# Patient Record
Sex: FEMALE | Race: BLACK OR AFRICAN AMERICAN | Employment: FULL TIME | URBAN - METROPOLITAN AREA
[De-identification: names, ages, dates, MRNs, and addresses within clinical notes are randomized per-mention and may not be internally consistent; named-entity substitution may affect disease eponyms.]

---

## 2021-04-05 ENCOUNTER — OFFICE VISIT (OUTPATIENT)
Dept: ENT CLINIC | Age: 36
End: 2021-04-05
Payer: COMMERCIAL

## 2021-04-05 VITALS
WEIGHT: 240 LBS | SYSTOLIC BLOOD PRESSURE: 111 MMHG | TEMPERATURE: 97.4 F | BODY MASS INDEX: 40.97 KG/M2 | RESPIRATION RATE: 14 BRPM | HEART RATE: 72 BPM | DIASTOLIC BLOOD PRESSURE: 73 MMHG | HEIGHT: 64 IN

## 2021-04-05 DIAGNOSIS — H60.40 SQUAMOUS DEBRIS IN EAR CANAL: ICD-10-CM

## 2021-04-05 DIAGNOSIS — H60.332 ACUTE SWIMMER'S EAR OF LEFT SIDE: Primary | ICD-10-CM

## 2021-04-05 PROCEDURE — 4130F TOPICAL PREP RX AOE: CPT | Performed by: OTOLARYNGOLOGY

## 2021-04-05 PROCEDURE — 1036F TOBACCO NON-USER: CPT | Performed by: OTOLARYNGOLOGY

## 2021-04-05 PROCEDURE — 99203 OFFICE O/P NEW LOW 30 MIN: CPT | Performed by: OTOLARYNGOLOGY

## 2021-04-05 PROCEDURE — G8417 CALC BMI ABV UP PARAM F/U: HCPCS | Performed by: OTOLARYNGOLOGY

## 2021-04-05 PROCEDURE — G8427 DOCREV CUR MEDS BY ELIG CLIN: HCPCS | Performed by: OTOLARYNGOLOGY

## 2021-04-05 RX ORDER — CIPROFLOXACIN AND DEXAMETHASONE 3; 1 MG/ML; MG/ML
4 SUSPENSION/ DROPS AURICULAR (OTIC) 2 TIMES DAILY
Qty: 1 BOTTLE | Refills: 0 | Status: SHIPPED | OUTPATIENT
Start: 2021-04-05 | End: 2021-04-15

## 2021-04-05 SDOH — HEALTH STABILITY: MENTAL HEALTH: HOW OFTEN DO YOU HAVE A DRINK CONTAINING ALCOHOL?: NEVER

## 2021-04-05 ASSESSMENT — ENCOUNTER SYMPTOMS
RHINORRHEA: 0
SORE THROAT: 0
SINUS PAIN: 0

## 2021-04-05 NOTE — PROGRESS NOTES
[facility]       NEW PATIENT VISIT      PCP:  No primary care provider on file. CHIEF COMPLAINT  Chief Complaint   Patient presents with    Ear Problem     \"something in left ear\"    Hearing Loss       HISTORY OF PRESENT ILLNESS       Keli Aquino is a 39 y.o. female. She stated that her Left ear is blocked, with decreased hearing, but no pain or drainage, for 2 months, constant. REVIEW OF SYSTEMS   Review of Systems   Constitutional: Negative for chills, fever and unexpected weight change. HENT: Positive for hearing loss and tinnitus. Negative for congestion, ear discharge, ear pain, rhinorrhea, sinus pain and sore throat. PAST MEDICAL HISTORY    History reviewed. No pertinent past medical history. Past Surgical History:   Procedure Laterality Date     SECTION           EXAMINATION      Vitals:    21 1521   BP: 111/73   Pulse: 72   Resp: 14   Temp: 97.4 °F (36.3 °C)   Weight: 240 lb (108.9 kg)   Height: 5' 4\" (1.626 m)         Physical Exam  Vitals signs reviewed. Constitutional:       General: She is awake. She is not in acute distress. Appearance: Normal appearance. She is well-developed. She is not ill-appearing or toxic-appearing. HENT:      Head: Normocephalic and atraumatic. Salivary Glands: Right salivary gland is not diffusely enlarged or tender. Left salivary gland is not diffusely enlarged or tender. Right Ear: Tympanic membrane, ear canal and external ear normal.      Left Ear: Tympanic membrane and external ear normal. Drainage and swelling present. There is impacted cerumen (and purulent exudate and debris). Ears:      Comments: Bilateral otomicroscopy performed. Left EAC with debris and cerumen impaction, removed with suction. EAC with erythema and edema. TM mobile and normal.  Ciprodex gtts instilled and CB meatal plug placed.        Nose: Nose normal. No nasal deformity, septal deviation, mucosal edema, congestion or rhinorrhea. Right Turbinates: Not enlarged. Left Turbinates: Not enlarged. Right Sinus: No maxillary sinus tenderness or frontal sinus tenderness. Left Sinus: No maxillary sinus tenderness or frontal sinus tenderness. Mouth/Throat:      Lips: Pink. No lesions. Mouth: Mucous membranes are moist. No oral lesions. Tongue: No lesions. Palate: No mass and lesions. Pharynx: Oropharynx is clear. Uvula midline. No oropharyngeal exudate or posterior oropharyngeal erythema. Tonsils: No tonsillar exudate or tonsillar abscesses. Neck:      Musculoskeletal: Normal range of motion and neck supple. No neck rigidity or muscular tenderness. Thyroid: No thyroid mass, thyromegaly or thyroid tenderness. Trachea: No tracheal deviation. Lymphadenopathy:      Cervical: No cervical adenopathy. Neurological:      Mental Status: She is alert. Radha Donahue / Lluvia Hughes / Teri Tian was seen today for ear problem and hearing loss. Diagnoses and all orders for this visit:    Acute swimmer's ear of left side  -     ciprofloxacin-dexamethasone (CIPRODEX) 0.3-0.1 % otic suspension; Place 4 drops into the left ear 2 times daily for 10 days    Squamous debris in ear canal        RECOMMENDATIONS/PLAN      1. Acetaminophen (eg. Tylenol) or Ibuprofen (eg. Advil) (over the counter medications) as needed for pain. 2. Return in about 10 days (around 4/15/2021) for recheck/follow-up, possible ear cleaning, and sooner if condition worsens.           MEDICAL DECISION MAKING    # and complexity of problems addressed:  87456 - Low  1 acute, uncomplicated illness or injury     Amount and/or Complexity of Data to be Reviewed and Analyzed  44442 - Straightforward  no data or only 1 test or document reviewed or ordered    Risk of Complications and /or Morbidity or Mortality of Patient Management  06183 - Moderate  Prescription drug management

## 2021-04-19 ENCOUNTER — OFFICE VISIT (OUTPATIENT)
Dept: ENT CLINIC | Age: 36
End: 2021-04-19
Payer: COMMERCIAL

## 2021-04-19 VITALS
TEMPERATURE: 97.7 F | RESPIRATION RATE: 16 BRPM | OXYGEN SATURATION: 99 % | HEART RATE: 69 BPM | BODY MASS INDEX: 36.37 KG/M2 | DIASTOLIC BLOOD PRESSURE: 72 MMHG | HEIGHT: 68 IN | SYSTOLIC BLOOD PRESSURE: 108 MMHG | WEIGHT: 240 LBS

## 2021-04-19 DIAGNOSIS — H60.332 ACUTE SWIMMER'S EAR OF LEFT SIDE: Primary | ICD-10-CM

## 2021-04-19 PROCEDURE — 99212 OFFICE O/P EST SF 10 MIN: CPT | Performed by: OTOLARYNGOLOGY

## 2021-04-19 PROCEDURE — G8427 DOCREV CUR MEDS BY ELIG CLIN: HCPCS | Performed by: OTOLARYNGOLOGY

## 2021-04-19 PROCEDURE — G8417 CALC BMI ABV UP PARAM F/U: HCPCS | Performed by: OTOLARYNGOLOGY

## 2021-04-19 PROCEDURE — 1036F TOBACCO NON-USER: CPT | Performed by: OTOLARYNGOLOGY

## 2021-04-19 PROCEDURE — 4130F TOPICAL PREP RX AOE: CPT | Performed by: OTOLARYNGOLOGY

## 2021-04-19 ASSESSMENT — ENCOUNTER SYMPTOMS: SORE THROAT: 0

## 2021-04-19 NOTE — PROGRESS NOTES
[facility]         PCP:  No primary care provider on file. CHIEF COMPLAINT  Chief Complaint   Patient presents with    Otitis Externa       HISTORY OF PRESENT ILLNESS       Kirill Downs is a 39 y.o. female here for recheck and follow up of left external otitis. No problem with ear, all cleared up, both ears okay. REVIEW OF SYSTEMS   Review of Systems   Constitutional: Negative for chills and fever. HENT: Negative for ear discharge, ear pain, hearing loss and sore throat. PAST MEDICAL HISTORY    No past medical history on file. Past Surgical History:   Procedure Laterality Date     SECTION           EXAMINATION      Vitals:    21 1559   BP: 108/72   Pulse: 69   Resp: 16   Temp: 97.7 °F (36.5 °C)   TempSrc: Temporal   SpO2: 99%   Weight: 240 lb (108.9 kg)   Height: 5' 8\" (1.727 m)         Physical Exam  Vitals signs reviewed. Constitutional:       General: She is not in acute distress. Appearance: Normal appearance. She is not ill-appearing. HENT:      Head: Normocephalic. Right Ear: Tympanic membrane, ear canal and external ear normal.      Left Ear: Tympanic membrane, ear canal and external ear normal.      Ears:      Comments: Bilateral otomicroscopy performed. Neurological:      Mental Status: She is alert. Bilateral otomicroscopy performed. TM and EAC nomrl         IMPRESSION / Clementina Marques / Renetta Stains was seen today for otitis externa. Diagnoses and all orders for this visit:    Acute swimmer's ear of left side  Comments:  resolved. RECOMMENDATIONS/PLAN      Return for any further ENT or sinus problems or symptoms.           MEDICAL DECISION MAKING  # and complexity of problems addressed:  75945  - Straight forward1 self-limited or minor problem    Amount and/or Complexity of Data to be Reviewed and Analyzed   - Straightforward  no data or only 1 test or document reviewed or ordered    Risk of Complications and /or Morbidity or Mortality of Patient Management  19907 - minimal

## 2021-09-09 ENCOUNTER — OFFICE VISIT (OUTPATIENT)
Dept: ENT CLINIC | Age: 36
End: 2021-09-09
Payer: COMMERCIAL

## 2021-09-09 VITALS — TEMPERATURE: 97.5 F | HEART RATE: 73 BPM | SYSTOLIC BLOOD PRESSURE: 127 MMHG | DIASTOLIC BLOOD PRESSURE: 80 MMHG

## 2021-09-09 DIAGNOSIS — H61.22 IMPACTED CERUMEN OF LEFT EAR: ICD-10-CM

## 2021-09-09 DIAGNOSIS — H90.12 CONDUCTIVE HEARING LOSS OF LEFT EAR WITH UNRESTRICTED HEARING OF RIGHT EAR: ICD-10-CM

## 2021-09-09 DIAGNOSIS — H60.332 ACUTE SWIMMER'S EAR OF LEFT SIDE: Primary | ICD-10-CM

## 2021-09-09 PROCEDURE — 1036F TOBACCO NON-USER: CPT | Performed by: OTOLARYNGOLOGY

## 2021-09-09 PROCEDURE — 69210 REMOVE IMPACTED EAR WAX UNI: CPT | Performed by: OTOLARYNGOLOGY

## 2021-09-09 PROCEDURE — 4130F TOPICAL PREP RX AOE: CPT | Performed by: OTOLARYNGOLOGY

## 2021-09-09 PROCEDURE — 99213 OFFICE O/P EST LOW 20 MIN: CPT | Performed by: OTOLARYNGOLOGY

## 2021-09-09 PROCEDURE — G8427 DOCREV CUR MEDS BY ELIG CLIN: HCPCS | Performed by: OTOLARYNGOLOGY

## 2021-09-09 PROCEDURE — G8417 CALC BMI ABV UP PARAM F/U: HCPCS | Performed by: OTOLARYNGOLOGY

## 2021-09-09 RX ORDER — CIPROFLOXACIN AND DEXAMETHASONE 3; 1 MG/ML; MG/ML
4 SUSPENSION/ DROPS AURICULAR (OTIC) 2 TIMES DAILY
Qty: 7.5 ML | Refills: 0 | Status: SHIPPED | OUTPATIENT
Start: 2021-09-09 | End: 2021-09-19

## 2021-09-09 ASSESSMENT — ENCOUNTER SYMPTOMS
RHINORRHEA: 0
SORE THROAT: 0

## 2021-09-09 NOTE — PROGRESS NOTES
[facility]       PCP:  No primary care provider on file. CHIEF COMPLAINT  Chief Complaint   Patient presents with    Ear Problem     left ear is blocked        HISTORY OF PRESENT ILLNESS       Mica Tovar is a 39 y.o. female here for evaluation and treatment of blocked left ear, for two weeks . REVIEW OF SYSTEMS   Review of Systems   Constitutional: Negative for chills and fever. HENT: Positive for ear pain. Negative for ear discharge, rhinorrhea and sore throat. PAST MEDICAL HISTORY    No past medical history on file. Past Surgical History:   Procedure Laterality Date     SECTION           EXAMINATION      Vitals:    21 1105   BP: 127/80   Site: Left Upper Arm   Position: Sitting   Cuff Size: Large Adult   Pulse: 73   Temp: 97.5 °F (36.4 °C)   TempSrc: Temporal       Physical Exam  Vitals reviewed. Constitutional:       General: She is awake. She is not in acute distress. Appearance: Normal appearance. She is well-developed. She is not ill-appearing or toxic-appearing. HENT:      Head: Normocephalic and atraumatic. Salivary Glands: Right salivary gland is not diffusely enlarged or tender. Left salivary gland is not diffusely enlarged or tender. Right Ear: External ear normal. There is impacted cerumen. Left Ear: External ear normal. There is impacted cerumen. Nose: Nose normal. No nasal deformity, septal deviation, mucosal edema, congestion or rhinorrhea. Right Turbinates: Not enlarged. Left Turbinates: Not enlarged. Right Sinus: No maxillary sinus tenderness or frontal sinus tenderness. Left Sinus: No maxillary sinus tenderness or frontal sinus tenderness. Mouth/Throat:      Lips: Pink. No lesions. Mouth: Mucous membranes are moist. No oral lesions. Tongue: No lesions. Palate: No mass and lesions.       Pharynx: Oropharynx is clear. Uvula midline. No oropharyngeal exudate or posterior oropharyngeal erythema. Tonsils: No tonsillar exudate or tonsillar abscesses. Neck:      Thyroid: No thyroid mass, thyromegaly or thyroid tenderness. Trachea: No tracheal deviation. Comments: No cervical masses or tenderness. Musculoskeletal:      Cervical back: Normal range of motion and neck supple. Lymphadenopathy:      Cervical: No cervical adenopathy. Neurological:      Mental Status: She is alert. PROCEDURE - REMOVAL OF CERUMEN IMPACTION (75032):  Obstructing cerumen impaction occluding the both of the EACs  and obscuring visualization of the TMs, was removed under otomicroscopic visualization, with instrumentation, using a Billeau wire loop  Under otomicroscopic examination, there was a left external otitis. The right  EAC and both of the TMs appeared to be normal.  Mado reported improved hearing, back to usual level, after cerumen removal.    HEARING ASSESSMENT (after ear cleaning):  Finger rub:  Able to hear finger rub bilaterally. Tuning fork tests, [address] tuning fork:  Power test:  midline   Rinne test: right ear air greater than bone and left ear  air greater than bone        IMPRESSION / DIAGNOSES / Alyse Mock was seen today for ear problem. Diagnoses and all orders for this visit:    Acute swimmer's ear of left side  -     ciprofloxacin-dexamethasone (CIPRODEX) 0.3-0.1 % otic suspension; Place 4 drops into the left ear 2 times daily for 10 days    Impacted cerumen of left ear    Conductive hearing loss of left ear with unrestricted hearing of right ear           RECOMMENDATIONS/PLAN     Return in about 11 days (around 9/20/2021) for recheck/follow-up, possible ear cleaning, and sooner if condition worsens. Patient Instructions   1. You may use acetaminophen (eg. Tylenol) or Ibuprofen (eg. Advil) (over the counter medications) as needed for fever or pain.   2. Use ear drops as prescribed until you return for recheck in 11 days. 3. WATER PRECAUTIONS  · Do not allow water to get into your left ear, as this may cause, or worsen, an ear infection. · Before bathing, showering or washing your hair, a plug of cotton coated with petroleum jelly should be placed in the opening of your ear canal.  After bathing the cotton should be removed and the outer part of your ear dried with a soft towel. Alternatively, you may use a silicone putty ear plug purchased from your pharmacy. · Swimming may be permitted if you wear adequate ear plugs. · If water does enter your ear, drain the water out into a tissue or cotton ball. Then, instill into your ear four drops of the eardrops you were prescribed.

## 2021-09-09 NOTE — PATIENT INSTRUCTIONS
1. You may use acetaminophen (eg. Tylenol) or Ibuprofen (eg. Advil) (over the counter medications) as needed for fever or pain. 2. Use ear drops as prescribed until you return for recheck in 11 days. 3. WATER PRECAUTIONS  · Do not allow water to get into your left ear, as this may cause, or worsen, an ear infection. · Before bathing, showering or washing your hair, a plug of cotton coated with petroleum jelly should be placed in the opening of your ear canal.  After bathing the cotton should be removed and the outer part of your ear dried with a soft towel. Alternatively, you may use a silicone putty ear plug purchased from your pharmacy. · Swimming may be permitted if you wear adequate ear plugs. · If water does enter your ear, drain the water out into a tissue or cotton ball. Then, instill into your ear four drops of the eardrops you were prescribed.

## 2021-09-20 ENCOUNTER — OFFICE VISIT (OUTPATIENT)
Dept: ENT CLINIC | Age: 36
End: 2021-09-20
Payer: COMMERCIAL

## 2021-09-20 VITALS — DIASTOLIC BLOOD PRESSURE: 70 MMHG | SYSTOLIC BLOOD PRESSURE: 103 MMHG | HEART RATE: 75 BPM | TEMPERATURE: 97.3 F

## 2021-09-20 DIAGNOSIS — B36.9 ACUTE FUNGAL OTITIS EXTERNA: ICD-10-CM

## 2021-09-20 DIAGNOSIS — H62.40 ACUTE FUNGAL OTITIS EXTERNA: ICD-10-CM

## 2021-09-20 DIAGNOSIS — H60.332 ACUTE SWIMMER'S EAR OF LEFT SIDE: Primary | ICD-10-CM

## 2021-09-20 PROCEDURE — G8427 DOCREV CUR MEDS BY ELIG CLIN: HCPCS | Performed by: OTOLARYNGOLOGY

## 2021-09-20 PROCEDURE — 1036F TOBACCO NON-USER: CPT | Performed by: OTOLARYNGOLOGY

## 2021-09-20 PROCEDURE — 99213 OFFICE O/P EST LOW 20 MIN: CPT | Performed by: OTOLARYNGOLOGY

## 2021-09-20 PROCEDURE — G8417 CALC BMI ABV UP PARAM F/U: HCPCS | Performed by: OTOLARYNGOLOGY

## 2021-09-20 PROCEDURE — 4130F TOPICAL PREP RX AOE: CPT | Performed by: OTOLARYNGOLOGY

## 2021-09-20 RX ORDER — HYDROCORTISONE AND ACETIC ACID 20.75; 10.375 MG/ML; MG/ML
4 SOLUTION AURICULAR (OTIC) 4 TIMES DAILY
Qty: 10 ML | Refills: 0 | Status: SHIPPED | OUTPATIENT
Start: 2021-09-20 | End: 2021-10-04

## 2021-09-20 ASSESSMENT — ENCOUNTER SYMPTOMS
SINUS PAIN: 0
SORE THROAT: 0

## 2021-09-20 NOTE — PATIENT INSTRUCTIONS
WATER PRECAUTIONS  · Do not allow water to get into your left ear, as this may cause, or worsen, an ear infection. · Before bathing, showering or washing your hair, a plug of cotton coated with petroleum jelly should be placed in the opening of your ear canal.  After bathing the cotton should be removed and the outer part of your ear dried with a soft towel. Alternatively, you may use a silicone putty ear plug purchased from your pharmacy. · If water does enter your ear, drain the water out into a tissue or cotton ball. Then, instill into your ear four drops of the eardrops you were prescribed.   ·

## 2021-09-20 NOTE — PROGRESS NOTES
[facility]       PCP:  No primary care provider on file. CHIEF COMPLAINT  Chief Complaint   Patient presents with    Follow-up     nothing has changed       HISTORY OF PRESENT ILLNESS       Rahel Castellanos is a 39 y.o. female here for recheck and follow up of external otitis. REVIEW OF SYSTEMS   Review of Systems   Constitutional: Negative for chills and fever. HENT: Positive for ear discharge, ear pain and hearing loss. Negative for sinus pain, sore throat and tinnitus. PAST MEDICAL HISTORY    No past medical history on file. Past Surgical History:   Procedure Laterality Date     SECTION           EXAMINATION    Vitals:    21 1545   BP: 103/70   Pulse: 75   Temp: 97.3 °F (36.3 °C)       Physical Exam  Vitals reviewed. Constitutional:       General: She is not in acute distress. Appearance: Normal appearance. She is not ill-appearing. HENT:      Head: Normocephalic. Neurological:      Mental Status: She is alert. Ears, OMscopy:   fungus and skin packed tighitly into medial eac removed with irrigation with peroxide and suction, very difficult to remove. TM intact. EAC with erythema and edema and exudate. Hearing  better after cleaning. Prashanth Soriano / Con Pradhanfield / Jannet List was seen today for follow-up. Diagnoses and all orders for this visit:    Acute swimmer's ear of left side  -     acetic acid-hydrocortisone (VOSOL-HC) 1-2 % otic solution; Place 4 drops into the left ear 4 times daily for 14 days    Acute fungal otitis externa  Comments:  left ear  Orders:  -     acetic acid-hydrocortisone (VOSOL-HC) 1-2 % otic solution; Place 4 drops into the left ear 4 times daily for 14 days           RECOMMENDATIONS/PLAN      1. See Patient Instructions on file for this visit, which were discussed with the patient. 2. Acetaminophen (eg. Tylenol) or Ibuprofen (eg.  Advil) (over the counter medications) as needed for pain. 3. Vosol-HC      4. Return in about 1 week (around 9/27/2021) for recheck/follow-up, ear cleaning, and sooner if condition worsens. Patient Instructions     WATER PRECAUTIONS  · Do not allow water to get into your left ear, as this may cause, or worsen, an ear infection. · Before bathing, showering or washing your hair, a plug of cotton coated with petroleum jelly should be placed in the opening of your ear canal.  After bathing the cotton should be removed and the outer part of your ear dried with a soft towel. Alternatively, you may use a silicone putty ear plug purchased from your pharmacy. · If water does enter your ear, drain the water out into a tissue or cotton ball. Then, instill into your ear four drops of the eardrops you were prescribed.   ·

## 2021-09-27 ENCOUNTER — OFFICE VISIT (OUTPATIENT)
Dept: ENT CLINIC | Age: 36
End: 2021-09-27
Payer: COMMERCIAL

## 2021-09-27 VITALS — TEMPERATURE: 97.1 F | HEART RATE: 71 BPM | SYSTOLIC BLOOD PRESSURE: 120 MMHG | DIASTOLIC BLOOD PRESSURE: 78 MMHG

## 2021-09-27 DIAGNOSIS — B36.9 ACUTE FUNGAL OTITIS EXTERNA: ICD-10-CM

## 2021-09-27 DIAGNOSIS — H62.40 ACUTE FUNGAL OTITIS EXTERNA: ICD-10-CM

## 2021-09-27 DIAGNOSIS — H60.332 ACUTE SWIMMER'S EAR OF LEFT SIDE: Primary | ICD-10-CM

## 2021-09-27 PROCEDURE — 99212 OFFICE O/P EST SF 10 MIN: CPT | Performed by: OTOLARYNGOLOGY

## 2021-09-27 PROCEDURE — 1036F TOBACCO NON-USER: CPT | Performed by: OTOLARYNGOLOGY

## 2021-09-27 PROCEDURE — G8427 DOCREV CUR MEDS BY ELIG CLIN: HCPCS | Performed by: OTOLARYNGOLOGY

## 2021-09-27 PROCEDURE — G8417 CALC BMI ABV UP PARAM F/U: HCPCS | Performed by: OTOLARYNGOLOGY

## 2021-09-27 PROCEDURE — 4130F TOPICAL PREP RX AOE: CPT | Performed by: OTOLARYNGOLOGY

## 2021-09-27 ASSESSMENT — ENCOUNTER SYMPTOMS
SORE THROAT: 0
RHINORRHEA: 0

## 2021-09-27 NOTE — PROGRESS NOTES
[facility]       PCP:  No primary care provider on file. CHIEF COMPLAINT  Chief Complaint   Patient presents with    Ear Problem     patient states she no longer has pain in left ear, and has improved hearing in left ear. HISTORY OF PRESENT ILLNESS       Keli Murray is a 39 y.o. female here for recheck and follow up of left external otitis. She stated that all symptoms have cleared up. REVIEW OF SYSTEMS   Review of Systems   Constitutional: Negative for chills and fever. HENT: Negative for ear discharge, ear pain, hearing loss, rhinorrhea, sore throat and tinnitus. PAST MEDICAL HISTORY    No past medical history on file. Past Surgical History:   Procedure Laterality Date     SECTION  2014         EXAMINATION    Vitals:    21 1624   BP: 120/78   Site: Right Upper Arm   Position: Sitting   Cuff Size: Medium Adult   Pulse: 71   Temp: 97.1 °F (36.2 °C)   TempSrc: Temporal       Physical Exam  Vitals reviewed. Constitutional:       General: She is not in acute distress. Appearance: Normal appearance. She is not ill-appearing. HENT:      Head: Normocephalic. Right Ear: Tympanic membrane, ear canal and external ear normal. There is impacted cerumen (mild and non-obstructive, removed). Left Ear: Tympanic membrane, ear canal and external ear normal. There is no impacted cerumen. Nose: Nose normal.      Mouth/Throat:      Mouth: Mucous membranes are moist.      Pharynx: Oropharynx is clear. No oropharyngeal exudate or posterior oropharyngeal erythema. Neck:      Comments: No cervical masses or tenderness. Neurological:      Mental Status: She is alert. Sharon Arauz / Sherin Snellen / Rc Lockwood was seen today for ear problem.     Diagnoses and all orders for this visit:    Acute swimmer's ear of left side  Comments:  resolved    Acute fungal otitis externa  Comments:  resolved           RECOMMENDATIONS/PLAN      Return if ear symptoms recur, or, for any further ENT or sinus problems or symptoms.